# Patient Record
Sex: FEMALE | Race: BLACK OR AFRICAN AMERICAN | NOT HISPANIC OR LATINO | URBAN - METROPOLITAN AREA
[De-identification: names, ages, dates, MRNs, and addresses within clinical notes are randomized per-mention and may not be internally consistent; named-entity substitution may affect disease eponyms.]

---

## 2024-09-11 ENCOUNTER — APPOINTMENT (RX ONLY)
Dept: URBAN - METROPOLITAN AREA CLINIC 166 | Facility: CLINIC | Age: 39
Setting detail: DERMATOLOGY
End: 2024-09-11

## 2024-09-11 DIAGNOSIS — L30.9 DERMATITIS, UNSPECIFIED: ICD-10-CM | Status: WORSENING

## 2024-09-11 DIAGNOSIS — L21.8 OTHER SEBORRHEIC DERMATITIS: ICD-10-CM | Status: WORSENING

## 2024-09-11 DIAGNOSIS — H20.82: ICD-10-CM | Status: WORSENING

## 2024-09-11 PROBLEM — H20.823: Status: ACTIVE | Noted: 2024-09-11

## 2024-09-11 PROCEDURE — 99204 OFFICE O/P NEW MOD 45 MIN: CPT

## 2024-09-11 PROCEDURE — ? COUNSELING

## 2024-09-11 PROCEDURE — ? RECOMMENDATIONS

## 2024-09-11 PROCEDURE — ? PRESCRIPTION

## 2024-09-11 PROCEDURE — ? PRESCRIPTION MEDICATION MANAGEMENT

## 2024-09-11 RX ORDER — KETOCONAZOLE 20 MG/ML
SHAMPOO, SUSPENSION TOPICAL BIW
Qty: 120 | Refills: 3 | Status: ERX | COMMUNITY
Start: 2024-09-11

## 2024-09-11 RX ORDER — TRIAMCINOLONE ACETONIDE 1 MG/G
OINTMENT TOPICAL TWICE DAILY
Qty: 454 | Refills: 0 | Status: ERX | COMMUNITY
Start: 2024-09-11

## 2024-09-11 RX ORDER — PREDNISONE 10 MG/1
TABLET ORAL
Qty: 86 | Refills: 0 | Status: ERX | COMMUNITY
Start: 2024-09-11

## 2024-09-11 RX ORDER — CLOBETASOL PROPIONATE 0.5 MG/ML
SOLUTION TOPICAL
Qty: 50 | Refills: 2 | Status: ERX | COMMUNITY
Start: 2024-09-11

## 2024-09-11 RX ADMIN — TRIAMCINOLONE ACETONIDE: 1 OINTMENT TOPICAL at 00:00

## 2024-09-11 RX ADMIN — CLOBETASOL PROPIONATE: 0.5 SOLUTION TOPICAL at 00:00

## 2024-09-11 RX ADMIN — PREDNISONE: 10 TABLET ORAL at 00:00

## 2024-09-11 RX ADMIN — KETOCONAZOLE: 20 SHAMPOO, SUSPENSION TOPICAL at 00:00

## 2024-09-11 ASSESSMENT — LOCATION DETAILED DESCRIPTION DERM
LOCATION DETAILED: PERIUMBILICAL SKIN
LOCATION DETAILED: RIGHT SUPERIOR PARIETAL SCALP
LOCATION DETAILED: RIGHT INFERIOR POSTERIOR HELIX
LOCATION DETAILED: RIGHT AREOLA
LOCATION DETAILED: LEFT PERIAREOLAR BREAST 12-1:00 REGION
LOCATION DETAILED: LEFT SUPERIOR PARIETAL SCALP
LOCATION DETAILED: RIGHT INFERIOR CENTRAL MALAR CHEEK
LOCATION DETAILED: LEFT PERIAREOLAR BREAST 1-2:00 REGION
LOCATION DETAILED: LEFT ANTERIOR PROXIMAL THIGH
LOCATION DETAILED: RIGHT VENTRAL DISTAL FOREARM
LOCATION DETAILED: LEFT MEDIAL UPPER BACK

## 2024-09-11 ASSESSMENT — LOCATION SIMPLE DESCRIPTION DERM
LOCATION SIMPLE: ABDOMEN
LOCATION SIMPLE: RIGHT CHEEK
LOCATION SIMPLE: RIGHT FOREARM
LOCATION SIMPLE: LEFT BREAST
LOCATION SIMPLE: RIGHT BREAST
LOCATION SIMPLE: LEFT THIGH
LOCATION SIMPLE: SCALP
LOCATION SIMPLE: LEFT UPPER BACK
LOCATION SIMPLE: RIGHT EAR

## 2024-09-11 ASSESSMENT — LOCATION ZONE DERM
LOCATION ZONE: LEG
LOCATION ZONE: SCALP
LOCATION ZONE: EAR
LOCATION ZONE: ARM
LOCATION ZONE: TRUNK
LOCATION ZONE: FACE

## 2024-09-11 ASSESSMENT — BSA RASH: BSA RASH: 28

## 2024-09-11 ASSESSMENT — SEVERITY VITILIGO: VITILIGO SEVERITY: 3

## 2024-09-11 ASSESSMENT — BSA VITILIGO: % BODY COVERED IN VITILIGO: 14

## 2024-09-11 ASSESSMENT — ITCH NUMERIC RATING SCALE: HOW SEVERE IS YOUR ITCHING?: 5

## 2024-09-11 NOTE — PROCEDURE: COUNSELING
Detail Level: Detailed
Cleanser Recommendations: Dove, CeraVe
Antihistamine Recommendations: Allegra daily
Moisturizer Recommendations: CeraVe, Aveeno, Vanicream
Moisturizer Recommendations: CeraVe, La Roche Posay or Vanicream
Cleanser Recommendations: Gentle Cleanser like CeraVe or Aveeno
Shampoo Recommendations: Dove Scalp Therapy Shampoo with Zinc
Detail Level: Zone

## 2024-09-11 NOTE — HPI: RASH
Is This A New Presentation, Or A Follow-Up?: Rash
How Severe Is Your Rash?: moderate
Additional History: The patient stated that it started on the back of the neck, then it went over the body. The patient used Head and shoulder for scalp. The patient has a history of VKH.

## 2024-09-11 NOTE — PROCEDURE: RECOMMENDATIONS
Recommendation Preamble: The following recommendations were made during the visit:
Detail Level: Detailed
Render Risk Assessment In Note?: no
Recommendations (Free Text): Referral Dr. Romano.

## 2024-09-11 NOTE — PROCEDURE: PRESCRIPTION MEDICATION MANAGEMENT
Initiate Treatment: Triamcinolone twice daily x 2 week body.\\nPrednisone 60 mg daily x 4 days, 50 mg x 4 days, 40 mg x 4 days, 30 mg x 4 days,  20 mg x 4 days, then 10 mg x 4 days.
Render In Strict Bullet Format?: No
Plan: Referral to Dr. Romano.
Detail Level: Zone
Initiate Treatment: Ketoconazole shampoo leave on 10 - 15 mins, then rinse once a week.\\nClobetasol solution apply twice daily x 2 week, off 1 week, then repeat.

## 2024-10-10 ENCOUNTER — RX ONLY (OUTPATIENT)
Age: 39
Setting detail: RX ONLY
End: 2024-10-10

## 2024-10-10 ENCOUNTER — APPOINTMENT (RX ONLY)
Dept: URBAN - METROPOLITAN AREA CLINIC 166 | Facility: CLINIC | Age: 39
Setting detail: DERMATOLOGY
End: 2024-10-10

## 2024-10-10 DIAGNOSIS — H20.82: ICD-10-CM | Status: WORSENING

## 2024-10-10 DIAGNOSIS — L65.9 NONSCARRING HAIR LOSS, UNSPECIFIED: ICD-10-CM

## 2024-10-10 DIAGNOSIS — L30.9 DERMATITIS, UNSPECIFIED: ICD-10-CM | Status: RESOLVING

## 2024-10-10 DIAGNOSIS — L21.8 OTHER SEBORRHEIC DERMATITIS: ICD-10-CM

## 2024-10-10 PROBLEM — H20.829: Status: ACTIVE | Noted: 2024-10-10

## 2024-10-10 PROCEDURE — ? COUNSELING

## 2024-10-10 PROCEDURE — ? PRESCRIPTION MEDICATION MANAGEMENT

## 2024-10-10 PROCEDURE — ? INTRALESIONAL KENALOG

## 2024-10-10 PROCEDURE — 11900 INJECT SKIN LESIONS </W 7: CPT

## 2024-10-10 PROCEDURE — 99214 OFFICE O/P EST MOD 30 MIN: CPT | Mod: 25

## 2024-10-10 PROCEDURE — ? RECOMMENDATIONS

## 2024-10-10 RX ORDER — KETOCONAZOLE 20 MG/ML
SHAMPOO, SUSPENSION TOPICAL BIW
Qty: 120 | Refills: 3 | Status: ERX

## 2024-10-10 RX ORDER — TRIAMCINOLONE ACETONIDE 1 MG/G
OINTMENT TOPICAL TWICE DAILY
Qty: 454 | Refills: 0 | Status: ERX

## 2024-10-10 RX ORDER — CLOBETASOL PROPIONATE 0.5 MG/ML
SOLUTION TOPICAL
Qty: 50 | Refills: 2 | Status: ERX

## 2024-10-10 ASSESSMENT — LOCATION DETAILED DESCRIPTION DERM
LOCATION DETAILED: POSTERIOR MID-PARIETAL SCALP
LOCATION DETAILED: LEFT ANTERIOR PROXIMAL THIGH
LOCATION DETAILED: UPPER STERNUM
LOCATION DETAILED: RIGHT VENTRAL DISTAL FOREARM
LOCATION DETAILED: LEFT PERIAREOLAR BREAST 12-1:00 REGION
LOCATION DETAILED: UPPER STERNUM
LOCATION DETAILED: RIGHT SUPERIOR PARIETAL SCALP
LOCATION DETAILED: LEFT MEDIAL UPPER BACK

## 2024-10-10 ASSESSMENT — LOCATION SIMPLE DESCRIPTION DERM
LOCATION SIMPLE: CHEST
LOCATION SIMPLE: RIGHT FOREARM
LOCATION SIMPLE: LEFT UPPER BACK
LOCATION SIMPLE: CHEST
LOCATION SIMPLE: POSTERIOR SCALP
LOCATION SIMPLE: LEFT BREAST
LOCATION SIMPLE: LEFT THIGH
LOCATION SIMPLE: SCALP

## 2024-10-10 ASSESSMENT — LOCATION ZONE DERM
LOCATION ZONE: ARM
LOCATION ZONE: LEG
LOCATION ZONE: TRUNK
LOCATION ZONE: SCALP
LOCATION ZONE: TRUNK

## 2024-10-10 ASSESSMENT — BSA VITILIGO: % BODY COVERED IN VITILIGO: 15

## 2024-10-10 ASSESSMENT — SEVERITY VITILIGO: VITILIGO SEVERITY: 2

## 2024-10-10 NOTE — PROCEDURE: COUNSELING
Cleanser Recommendations: Dove, CeraVe
Antihistamine Recommendations: Allegra daily
Detail Level: Detailed
Moisturizer Recommendations: CeraVe, Aveeno, Vanicream
Moisturizer Recommendations: CeraVe, La Roche Posay or Vanicream
Cleanser Recommendations: Gentle Cleanser like CeraVe or Aveeno
Shampoo Recommendations: Dove Scalp Therapy Shampoo with Zinc
Detail Level: Zone

## 2024-10-10 NOTE — PROCEDURE: PRESCRIPTION MEDICATION MANAGEMENT
Discontinue Regimen: Prednisone 60 mg daily x 4 days, 50 mg x 4 days, 40 mg x 4 days, 30 mg x 4 days,  20 mg x 4 days, then 10 mg x 4 days.
Continue Regimen: Triamcinolone twice daily x 2 weeks on 1 week off neck down as needed
Render In Strict Bullet Format?: No
Plan: Referral to Dr. Romano. Pt finished prednisone taper
Detail Level: Zone
Continue Regimen: Ketoconazole shampoo leave on 10 - 15 mins, then rinse once a week.\\nClobetasol solution apply twice daily x 2 week, off 1 week, then repeat.

## 2024-10-10 NOTE — PROCEDURE: RECOMMENDATIONS
Render Risk Assessment In Note?: no
Recommendation Preamble: The following recommendations were made during the visit:
Recommendations (Free Text): Continue the following treatments: Triamcinolone twice daily x 2 weeks on 1 week off neck down as needed.\\nPlan: Referral to Dr. Romano ([facility]). Pt finished prednisone taper.
Detail Level: Detailed

## 2024-10-10 NOTE — PROCEDURE: INTRALESIONAL KENALOG
How Many Mls Were Removed From The 40 Mg/Ml (5ml) Vial When Preparing The Injectable Solution?: 0
Kenalog Preparation: Kenalog
Bill For Wasted Drug (Kenalog)?: no
Ndc# For Kenalog Only: 3864-3697-35
Kenalog Type Of Vial: Multiple Dose
Medical Necessity Clause: This procedure was medically necessary because the lesions that were treated were:
Total Volume (Ccs): 1
Validate Note Data When Using Inventory: Yes
Detail Level: Detailed
Consent: The risks of atrophy were reviewed with the patient.
Show Inventory Tab: Hide
Lot # For Kenalog (Optional): [id number]
Concentration Of Kenalog Solution Injected (Mg/Ml): 10.0
Expiration Date For Kenalog (Optional): MARCH 2026